# Patient Record
Sex: MALE | Race: WHITE | Employment: UNEMPLOYED | URBAN - METROPOLITAN AREA
[De-identification: names, ages, dates, MRNs, and addresses within clinical notes are randomized per-mention and may not be internally consistent; named-entity substitution may affect disease eponyms.]

---

## 2019-09-11 ENCOUNTER — HOSPITAL ENCOUNTER (EMERGENCY)
Age: 44
Discharge: HOME | End: 2019-09-12
Attending: EMERGENCY MEDICINE
Payer: MEDICAID

## 2019-09-11 VITALS
TEMPERATURE: 98.4 F | BODY MASS INDEX: 20.76 KG/M2 | RESPIRATION RATE: 16 BRPM | HEIGHT: 70 IN | DIASTOLIC BLOOD PRESSURE: 90 MMHG | HEART RATE: 110 BPM | WEIGHT: 145 LBS | SYSTOLIC BLOOD PRESSURE: 123 MMHG

## 2019-09-11 DIAGNOSIS — G89.18 POST-OP PAIN: Primary | ICD-10-CM

## 2019-09-11 PROCEDURE — 99283 EMERGENCY DEPT VISIT LOW MDM: CPT

## 2019-09-11 RX ORDER — BUPROPION HYDROCHLORIDE 150 MG/1
150 TABLET, EXTENDED RELEASE ORAL 2 TIMES DAILY
COMMUNITY

## 2019-09-11 RX ORDER — OXYCODONE HYDROCHLORIDE 5 MG/1
5 TABLET ORAL EVERY 4 HOURS PRN
COMMUNITY
End: 2020-03-04

## 2019-09-11 RX ORDER — LIDOCAINE 50 MG/G
1 PATCH TOPICAL DAILY
Qty: 12 PATCH | Refills: 0 | Status: SHIPPED | OUTPATIENT
Start: 2019-09-11 | End: 2020-03-04

## 2019-09-11 ASSESSMENT — PAIN SCALES - GENERAL: PAINLEVEL_OUTOF10: 7

## 2019-09-11 ASSESSMENT — PAIN DESCRIPTION - FREQUENCY: FREQUENCY: INTERMITTENT

## 2019-09-11 ASSESSMENT — PAIN DESCRIPTION - PAIN TYPE: TYPE: ACUTE PAIN

## 2019-09-11 ASSESSMENT — PAIN DESCRIPTION - DESCRIPTORS: DESCRIPTORS: ACHING

## 2019-09-11 ASSESSMENT — PAIN DESCRIPTION - ORIENTATION: ORIENTATION: RIGHT

## 2019-09-12 NOTE — ED NOTES
Patient given box lunch, ambulates in room without difficulty.   Will call [address] RN  09/12/19 0023

## 2019-09-12 NOTE — ED NOTES
Patient states that he was not trying to OD, states that his  has been helping him to find placement.       Tabby Casey RN  09/12/19 0005

## 2019-09-12 NOTE — ED PROVIDER NOTES
May 15, 2020      Michelle Zapien  [address] [place] [address]        Dear Michelle Zapien,    My records indicate that you have not completed the Dexa scan that was recently ordered. These tests are very important in the management of your healthcare.      The tests have been re-ordered and can be completed at your convenience with an appointment. Please schedule an appointment for the lab at your earliest convenience. The order(s) will  in 30 days. Please call [phone] to make your appointment.    If you have any questions or concerns, please do not hesitate to contact us during normal business office hours. If the orders  again, I will assume that you no longer wish to have these done.    Sincerely,   Kim Lo MD                             moist.   Neck: Supple, symmetrical, trachea midline, no adenopathy. No jugular venous distention. Lungs:   Clear to auscultation bilaterally, respirationsunlabored. No rales, rhonchi or wheezes. Chest Wall:  No tenderness. Heart:  Regular rate and rhythm, S1 and S2 normal, no murmur, rub or gallop. Abdomen:   Soft, non-tender, bowel sounds active,   no masses, no organomegaly. Extremities: No edema, cords or calf tenderness. Full range of motion. Pulses: 2+ and symmetric   Skin: Turgor is normal, no rashes or lesions. Neurologic: Alert and oriented X 3. No focal findings. Motor grossly normal.  Speech clear, no drift, CN III-XII grossly intact,        DIAGNOSTIC RESULTS   LABS:    Labs Reviewed - No data to display    All other labs were within normal range or not returned as of this dictation. EKG: All EKG's are interpreted by the Emergency Department Physician who eithersigns or Co-signs this chart in the absence of a cardiologist.        RADIOLOGY:   Non-plain film images such as CT, Ultrasound and MRI are read by the radiologist. Plain radiographic images are visualized by myself. *    Interpretation per the Radiologist below, if available at the time of this note:    No orders to display         PROCEDURES   Unless otherwise noted below, none     Procedures    *    CRITICAL CARE TIME   N/A      EMERGENCY DEPARTMENT COURSE and DIFFERENTIALDIAGNOSIS/MDM:   Vitals:    Vitals:    09/11/19 2052   BP: (!) 123/90   Pulse: 110   Resp: 16   Temp: 98.4 °F (36.9 °C)   Weight: 65.8 kg (145 lb)   Height: 5' 10\" (1.778 m)       Patient was given thefollowing medications:  Medications - No data to display      The patient tolerated their visit well. The patient and / or the familywere informed of the results of any tests, a time was given to answer questions. FINAL IMPRESSION      1.  Post-op pain          DISPOSITION/PLAN   DISPOSITION Decision To Discharge 09/11/2019 09:41:43 PM    Patient

## 2020-03-04 ENCOUNTER — OFFICE VISIT (OUTPATIENT)
Dept: ENT CLINIC | Age: 45
End: 2020-03-04
Payer: MEDICAID

## 2020-03-04 VITALS — WEIGHT: 157.9 LBS | BODY MASS INDEX: 22.6 KG/M2 | HEIGHT: 70 IN

## 2020-03-04 PROBLEM — Z59.00 HOMELESSNESS: Status: ACTIVE | Noted: 2019-09-17

## 2020-03-04 PROBLEM — F41.9 ANXIETY: Status: ACTIVE | Noted: 2017-11-01

## 2020-03-04 PROBLEM — G40.909 SEIZURE DISORDER (HCC): Status: ACTIVE | Noted: 2017-01-12

## 2020-03-04 PROBLEM — F19.10 POLYSUBSTANCE ABUSE (HCC): Status: ACTIVE | Noted: 2019-09-17

## 2020-03-04 PROBLEM — T07.XXXA: Status: ACTIVE | Noted: 2017-01-12

## 2020-03-04 PROBLEM — F15.10 METHAMPHETAMINE ABUSE (HCC): Status: ACTIVE | Noted: 2019-12-30

## 2020-03-04 PROBLEM — R68.84 JAW PAIN: Status: ACTIVE | Noted: 2018-04-05

## 2020-03-04 PROBLEM — Z87.898 HISTORY OF PSEUDOSEIZURE: Status: ACTIVE | Noted: 2019-09-17

## 2020-03-04 PROBLEM — Z65.3 PROBLEMS RELATED TO OTHER LEGAL CIRCUMSTANCES: Status: ACTIVE | Noted: 2019-09-17

## 2020-03-04 PROBLEM — Z72.0 TOBACCO ABUSE: Status: ACTIVE | Noted: 2019-12-30

## 2020-03-04 PROBLEM — F31.10 MANIC BIPOLAR I DISORDER (HCC): Status: ACTIVE | Noted: 2018-05-07

## 2020-03-04 PROBLEM — F95.9 TIC DISORDER: Status: ACTIVE | Noted: 2017-01-12

## 2020-03-04 PROBLEM — Z98.890: Status: ACTIVE | Noted: 2020-03-04

## 2020-03-04 PROBLEM — Z91.199 NONCOMPLIANCE WITH TREATMENT: Status: ACTIVE | Noted: 2019-09-17

## 2020-03-04 PROBLEM — G93.41 ACUTE METABOLIC ENCEPHALOPATHY: Status: ACTIVE | Noted: 2019-12-30

## 2020-03-04 PROBLEM — F19.94 SUBSTANCE INDUCED MOOD DISORDER (HCC): Status: ACTIVE | Noted: 2019-09-17

## 2020-03-04 PROBLEM — S52.511A: Status: ACTIVE | Noted: 2019-09-06

## 2020-03-04 PROCEDURE — G8420 CALC BMI NORM PARAMETERS: HCPCS | Performed by: OTOLARYNGOLOGY

## 2020-03-04 PROCEDURE — G8484 FLU IMMUNIZE NO ADMIN: HCPCS | Performed by: OTOLARYNGOLOGY

## 2020-03-04 PROCEDURE — G8427 DOCREV CUR MEDS BY ELIG CLIN: HCPCS | Performed by: OTOLARYNGOLOGY

## 2020-03-04 PROCEDURE — 99203 OFFICE O/P NEW LOW 30 MIN: CPT | Performed by: OTOLARYNGOLOGY

## 2020-03-04 PROCEDURE — 4004F PT TOBACCO SCREEN RCVD TLK: CPT | Performed by: OTOLARYNGOLOGY

## 2020-03-04 RX ORDER — FLUTICASONE PROPIONATE 50 MCG
1 SPRAY, SUSPENSION (ML) NASAL
COMMUNITY

## 2020-03-04 RX ORDER — PRAZOSIN HYDROCHLORIDE 1 MG/1
2 CAPSULE ORAL DAILY
COMMUNITY

## 2020-03-04 RX ORDER — IBUPROFEN 800 MG/1
800 TABLET ORAL
COMMUNITY
Start: 2019-09-12

## 2020-03-04 RX ORDER — POLYETHYLENE GLYCOL 3350 17 G/17G
17 POWDER, FOR SOLUTION ORAL
COMMUNITY

## 2020-03-04 RX ORDER — PENICILLIN V POTASSIUM 500 MG/1
500 TABLET ORAL 2 TIMES DAILY
COMMUNITY
Start: 2020-03-03 | End: 2020-03-13

## 2020-03-04 RX ORDER — BUPRENORPHINE AND NALOXONE 8; 2 MG/1; MG/1
2 FILM, SOLUBLE BUCCAL; SUBLINGUAL
COMMUNITY

## 2020-03-04 RX ORDER — CLONIDINE HYDROCHLORIDE 0.1 MG/1
0.1 TABLET ORAL
COMMUNITY

## 2020-03-27 ENCOUNTER — TELEPHONE (OUTPATIENT)
Dept: ENT CLINIC | Age: 45
End: 2020-03-27

## 2020-03-27 NOTE — TELEPHONE ENCOUNTER
He came up on the list for virtual visits apparently because his post audiogram follow-up appointment was never canceled when his audiogram was canceled. Therefore it was still on the list.  Therefore I asked for a follow-up virtual visit. However since he did not have the audiogram, he does not need to proceed with a follow-up visit until the audiogram is done. That will probably be a live visit because he cannot have the audiogram done until after the coronavirus restrictions are lifted. Therefore once his audiogram is rescheduled he should schedule follow-up visit with me.

## 2021-05-05 ENCOUNTER — OFFICE VISIT (OUTPATIENT)
Dept: ENT CLINIC | Age: 46
End: 2021-05-05
Payer: MEDICAID

## 2021-05-05 VITALS
DIASTOLIC BLOOD PRESSURE: 81 MMHG | TEMPERATURE: 98 F | RESPIRATION RATE: 14 BRPM | SYSTOLIC BLOOD PRESSURE: 112 MMHG | WEIGHT: 157 LBS | BODY MASS INDEX: 22.48 KG/M2 | HEART RATE: 67 BPM | HEIGHT: 70 IN

## 2021-05-05 DIAGNOSIS — Z90.09 HISTORY OF STAPEDECTOMY: Chronic | ICD-10-CM

## 2021-05-05 DIAGNOSIS — H91.93 BILATERAL HEARING LOSS, UNSPECIFIED HEARING LOSS TYPE: Primary | ICD-10-CM

## 2021-05-05 PROCEDURE — 99213 OFFICE O/P EST LOW 20 MIN: CPT | Performed by: OTOLARYNGOLOGY

## 2021-05-05 PROCEDURE — 1036F TOBACCO NON-USER: CPT | Performed by: OTOLARYNGOLOGY

## 2021-05-05 PROCEDURE — G8420 CALC BMI NORM PARAMETERS: HCPCS | Performed by: OTOLARYNGOLOGY

## 2021-05-05 PROCEDURE — G8427 DOCREV CUR MEDS BY ELIG CLIN: HCPCS | Performed by: OTOLARYNGOLOGY

## 2021-05-05 RX ORDER — LORATADINE 10 MG/1
10 CAPSULE, LIQUID FILLED ORAL DAILY
COMMUNITY

## 2021-05-05 ASSESSMENT — ENCOUNTER SYMPTOMS
RHINORRHEA: 0
SINUS PAIN: 0
SORE THROAT: 0

## 2021-05-05 NOTE — PROGRESS NOTES
[facility]       NEW PATIENT VISIT      PCP:  No primary care provider on file. REFERRED BY:   Dr. Tova Amaya at [facility] in [address] [facility]  Chief Complaint   Patient presents with    Hearing Loss     left ear. HISTORY OF PRESENT ILLNESS       Sudha Vicente is a 40 y.o. male here for evaluation and treatment of hearing loss. \"I can't hear out of my left ear. \"  He stated that he had a right stapedectomy in about 1988, which helped his hearing. Now hearing is decreased in left ear for about two years. Had no surgery on left ear. \"I have very little hearing out of it. \"  Right ear hearing \"decently. \"  Patient wants to have surgery on the left ear to improve his hearing. REVIEW OF SYSTEMS   Review of Systems   Constitutional: Negative for chills and fever. HENT: Positive for hearing loss. Negative for congestion, ear discharge, ear pain, rhinorrhea, sinus pain, sore throat and tinnitus. PAST MEDICAL HISTORY    History reviewed. No pertinent past medical history. Past Surgical History:   Procedure Laterality Date    EAR SURGERY Right 1988         EXAMINATION      Vitals:    05/05/21 1022   BP: 112/81   Pulse: 67   Resp: 14   Temp: 98 °F (36.7 °C)   Weight: 157 lb (71.2 kg)   Height: 5' 10\" (1.778 m)         Physical Exam  Vitals reviewed. Constitutional:       General: He is awake. He is not in acute distress. Appearance: Normal appearance. He is well-developed. He is not ill-appearing or toxic-appearing. HENT:      Head: Normocephalic and atraumatic. Salivary Glands: Right salivary gland is not diffusely enlarged or tender. Left salivary gland is not diffusely enlarged or tender.       Right Ear: Tympanic membrane, ear canal and external ear normal.      Left Ear: Tympanic membrane, ear canal and external ear normal.      Ears:      Angel exam findings: lateralizes left. Right Rinne: AC > BC. Left Rinne: BC > AC. Comments: Finger rub:  Able to hear finger rub at the external meatus bilaterally. Couldn't hear left Rinne air. \"I couldn't hear that at all. \"     Nose: Septal deviation (leftward mild) present. No nasal deformity, mucosal edema, congestion or rhinorrhea. Right Turbinates: Not enlarged. Left Turbinates: Not enlarged. Right Sinus: No maxillary sinus tenderness or frontal sinus tenderness. Left Sinus: No maxillary sinus tenderness or frontal sinus tenderness. Mouth/Throat:      Lips: Pink. No lesions. Mouth: Mucous membranes are moist. No oral lesions. Tongue: No lesions. Palate: No mass and lesions. Pharynx: Oropharynx is clear. Uvula midline. No oropharyngeal exudate or posterior oropharyngeal erythema. Tonsils: No tonsillar exudate or tonsillar abscesses. 1+ on the right. 1+ on the left. Neck:      Thyroid: No thyroid mass, thyromegaly or thyroid tenderness. Trachea: No tracheal deviation. Musculoskeletal:      Cervical back: Normal range of motion and neck supple. No rigidity. No muscular tenderness. Lymphadenopathy:      Cervical: No cervical adenopathy. Neurological:      Mental Status: He is alert. Alleman Rosalba / Carole Speaker / Juan Haskins was seen today for hearing loss. Diagnoses and all orders for this visit:    Bilateral hearing loss, unspecified hearing loss type  -     [address] [place]. MICHAEL, Audiology, [facility]    History of stapedectomy  Comments:  right ear, about 1988          RECOMMENDATIONS/PLAN      1. Audiometric testing.      2. Return for recheck/follow-up after hearing test.          MEDICAL DECISION MAKING    # and complexity of problems addressed:  86559 - Moderate  1 or more chronic illness with exacerbation, progression or side effect of tx (otosclerosis left ear with progression of hearing loss)    Amount and/or Complexity of Data to be Reviewed and Analyzed  82184 - Straightforward  no data or only 1 test or document reviewed or ordered    Risk of Complications and /or Morbidity or Mortality of Patient Management  46647 -Low

## 2021-09-03 ENCOUNTER — PROCEDURE VISIT (OUTPATIENT)
Dept: AUDIOLOGY | Age: 46
End: 2021-09-03
Payer: MEDICAID

## 2021-09-03 DIAGNOSIS — H90.0 CONDUCTIVE HEARING LOSS OF BOTH EARS: Primary | ICD-10-CM

## 2021-09-03 PROCEDURE — 92567 TYMPANOMETRY: CPT | Performed by: AUDIOLOGIST

## 2021-09-03 PROCEDURE — 92557 COMPREHENSIVE HEARING TEST: CPT | Performed by: AUDIOLOGIST

## 2021-09-03 NOTE — PROGRESS NOTES
[address] [facility]    9/3/2021     PCP: No primary care provider on file. MRN: <[id number]>     AUDIOLOGIC AND OTHER PERTINENT MEDICAL HISTORY:      Toy Chiu was seen for hearing and middle ear evaluation. Otolaryngology  Caleb Milton MD) is referral source of today's appointment. Patient presents with Hearing Loss. Interested in ear surgery of left ear. Reports history of right ear stapedectomy during childhood, which has improved hearing. AUDIOGRAM/IMMITTANCE (MIDDLE EAR FUNCTION) TEST RESULTS:      Audiogram demonstrates mild conductive loss of right ear. Severe conductive loss, that rises to mild, from left. Loss is asymmetrical, greater in left. Speech discrimination was excellent in quiet, at elevated levels. Quick SIN (hearing performance in noise) assessment was administered. Toy Chiu scored  23/25.5, consistent with 2.5 dB signal-noise ratio loss. This suggests minimal difficulty hearing during the presence of noise or complex listening enviornments. Right Immittance: Tympanometry consistent with normal middle ear status (Type A tympanogram)     Left Immittance:   Tympanometry consistent with normal middle ear status (Type A tympanogram)    Ipsilateral acoustic reflexes were elevated or absent at isolated frequencies, bilterally. Chart cc'd to: Caleb Milton MD                            COUNSELING:        Reviewed purpose of testing completed today, general auditory system function, and results obtained today.   Patient was provided with a copy of audiogram.          Degree of   Hearing Sensitivity dB Range   Within Normal Limits (WNL) 0 - 20   Mild 20 - 40   Moderate 40 - 55   Moderately-Severe 55 - 70   Severe 70 - 90   Profound 90 +          RECOMMENDATIONS:          Jewel Robert MD  to advise    Electronically signed by Karmen Gosselin, AuD on 9/3/21 at 10:47 AM.

## 2022-05-18 ENCOUNTER — OFFICE VISIT (OUTPATIENT)
Dept: ENT CLINIC | Age: 47
End: 2022-05-18
Payer: MEDICAID

## 2022-05-18 VITALS
OXYGEN SATURATION: 98 % | TEMPERATURE: 97.3 F | DIASTOLIC BLOOD PRESSURE: 79 MMHG | HEART RATE: 60 BPM | SYSTOLIC BLOOD PRESSURE: 110 MMHG

## 2022-05-18 DIAGNOSIS — H90.0 CONDUCTIVE HEARING LOSS OF BOTH EARS: Primary | Chronic | ICD-10-CM

## 2022-05-18 DIAGNOSIS — Z90.09 HISTORY OF STAPEDECTOMY: ICD-10-CM

## 2022-05-18 DIAGNOSIS — H80.92 OTOSCLEROSIS OF LEFT EAR: Chronic | ICD-10-CM

## 2022-05-18 PROCEDURE — G8427 DOCREV CUR MEDS BY ELIG CLIN: HCPCS | Performed by: OTOLARYNGOLOGY

## 2022-05-18 PROCEDURE — 99213 OFFICE O/P EST LOW 20 MIN: CPT | Performed by: OTOLARYNGOLOGY

## 2022-05-18 PROCEDURE — 1036F TOBACCO NON-USER: CPT | Performed by: OTOLARYNGOLOGY

## 2022-05-18 PROCEDURE — G8421 BMI NOT CALCULATED: HCPCS | Performed by: OTOLARYNGOLOGY

## 2022-05-18 NOTE — PATIENT INSTRUCTIONS
· You should consider amplification therapy, hearing aid, if you are having difficulty communicating and/or hearing important sounds. You may follow up with Dr. Lamar Sheridan or with another hearing aid provider of your choice. · You may have a decrease in understanding of speech in the presence of background noise and expected difficulty understanding speech in the presence of moderate to high level of background noise. · You should return if your ears plug up with ear wax causing difficulty hearing or pressure. · You should return for an annual hearing test to monitor your hearing, and whenever your hearing further decreases significantly. · You should avoid exposure to excessively high levels of noise/sound and use hearing protection measures we discussed, as needed, if such exposure is unavoidable. · NO Q-TIPS IN THE EARS  You should never clean your ears with a Q-tip, cotton tipped applicator, Yvette pin, paper clip, pen cap, nail file, or any other instrument. I recommend only use of one the several ear wax removal kits available \"over the counter\" (eg. [employer] or [employer], or [employer]) if you feel a need to try to remove ear wax. No other methods should be self used for this purpose as there is danger of injury to the ear and risk of irreparable and irreversible permanent hearing loss and permanent dizziness.

## 2022-05-18 NOTE — PROGRESS NOTES
[facility]       CHIEF COMPLAINT  Chief Complaint   Patient presents with    Hearing Loss         HISTORY       Dyana Izquierdo is a 56 y.o. male here for follow up of hearing loss. AUDIOGRAM    I independently assessed and evaluated the results of the audiogram, a test performed by another healthcare provider. COUNSELING    Audiogram results were reviewed and discussed with Sp Clinton. I advised of type and severity of hearing loss and the probable etiology of otosclerosis or ossicular fixation. I discussed the possible associated impairment. I advised of the need for avoidance of prolonged or frequent exposure to excessive noise and the need for noise protection, if such exposure is unavoidable. I discussed the possible benefits of hearing aids, or other amplification therapy. I discussed possible benefits and risks of surgery. I discussed stapedectomy surgery. I discussed the possible etiology of tinnitus and treatment/coping measures including masking techniques. I advised of the need for annual and prn hearing tests. Brennon Malloy / Caleb Lord / Kierra Hart / Pawan Leyva was seen today for hearing loss. Diagnoses and all orders for this visit:    Conductive hearing loss of both ears    Otosclerosis of left ear    History of stapedectomy  Comments:  right ear           RECOMMENDATIONS / PLAN    1. Left stapedectomy. Patient wishes to proceed. Will refer to Dr. Asuncion Rodriguez for stapedectomy. 2. Return for any further ENT or sinus problems or symptoms.

## 2024-05-04 ENCOUNTER — HOSPITAL ENCOUNTER (EMERGENCY)
Age: 49
Discharge: HOME | End: 2024-05-04
Attending: EMERGENCY MEDICINE
Payer: MEDICAID

## 2024-05-04 VITALS
BODY MASS INDEX: 21.5 KG/M2 | SYSTOLIC BLOOD PRESSURE: 115 MMHG | HEIGHT: 67 IN | RESPIRATION RATE: 12 BRPM | TEMPERATURE: 99.5 F | OXYGEN SATURATION: 94 % | DIASTOLIC BLOOD PRESSURE: 84 MMHG | WEIGHT: 137 LBS | HEART RATE: 70 BPM

## 2024-05-04 DIAGNOSIS — T40.601A OPIATE OVERDOSE, ACCIDENTAL OR UNINTENTIONAL, INITIAL ENCOUNTER (HCC): Primary | ICD-10-CM

## 2024-05-04 DIAGNOSIS — F19.10 POLYSUBSTANCE ABUSE (HCC): ICD-10-CM

## 2024-05-04 LAB
ALBUMIN SERPL-MCNC: 4.4 G/DL (ref 3.4–5)
ALBUMIN/GLOB SERPL: 1.5 {RATIO} (ref 1.1–2.2)
ALP SERPL-CCNC: 63 U/L (ref 40–129)
ALT SERPL-CCNC: 31 U/L (ref 10–40)
AMPHETAMINES UR QL SCN>1000 NG/ML: ABNORMAL
ANION GAP SERPL CALCULATED.3IONS-SCNC: 13 MMOL/L (ref 3–16)
AST SERPL-CCNC: 47 U/L (ref 15–37)
BARBITURATES UR QL SCN>200 NG/ML: ABNORMAL
BASOPHILS # BLD: 0 K/UL (ref 0–0.2)
BASOPHILS NFR BLD: 0.3 %
BENZODIAZ UR QL SCN>200 NG/ML: POSITIVE
BILIRUB SERPL-MCNC: 0.6 MG/DL (ref 0–1)
BUN SERPL-MCNC: 18 MG/DL (ref 7–20)
CALCIUM SERPL-MCNC: 9.1 MG/DL (ref 8.3–10.6)
CANNABINOIDS UR QL SCN>50 NG/ML: POSITIVE
CHLORIDE SERPL-SCNC: 101 MMOL/L (ref 99–110)
CO2 SERPL-SCNC: 25 MMOL/L (ref 21–32)
COCAINE UR QL SCN: POSITIVE
CREAT SERPL-MCNC: 1 MG/DL (ref 0.9–1.3)
DEPRECATED RDW RBC AUTO: 13.5 % (ref 12.4–15.4)
DRUG SCREEN COMMENT UR-IMP: ABNORMAL
EOSINOPHIL # BLD: 0.1 K/UL (ref 0–0.6)
EOSINOPHIL NFR BLD: 0.9 %
ETHANOLAMINE SERPL-MCNC: NORMAL MG/DL (ref 0–0.08)
FENTANYL SCREEN, URINE: POSITIVE
GFR SERPLBLD CREATININE-BSD FMLA CKD-EPI: >90 ML/MIN/{1.73_M2}
GLUCOSE SERPL-MCNC: 131 MG/DL (ref 70–99)
HCT VFR BLD AUTO: 38.2 % (ref 40.5–52.5)
HGB BLD-MCNC: 13.2 G/DL (ref 13.5–17.5)
LYMPHOCYTES # BLD: 1.2 K/UL (ref 1–5.1)
LYMPHOCYTES NFR BLD: 16.6 %
MCH RBC QN AUTO: 31.9 PG (ref 26–34)
MCHC RBC AUTO-ENTMCNC: 34.7 G/DL (ref 31–36)
MCV RBC AUTO: 92 FL (ref 80–100)
METHADONE UR QL SCN>300 NG/ML: ABNORMAL
MONOCYTES # BLD: 0.5 K/UL (ref 0–1.3)
MONOCYTES NFR BLD: 6.6 %
NEUTROPHILS # BLD: 5.4 K/UL (ref 1.7–7.7)
NEUTROPHILS NFR BLD: 75.6 %
OPIATES UR QL SCN>300 NG/ML: ABNORMAL
OXYCODONE UR QL SCN: ABNORMAL
PCP UR QL SCN>25 NG/ML: ABNORMAL
PH UR STRIP: 5 [PH]
PLATELET # BLD AUTO: 272 K/UL (ref 135–450)
PMV BLD AUTO: 7.4 FL (ref 5–10.5)
POTASSIUM SERPL-SCNC: 3.8 MMOL/L (ref 3.5–5.1)
PROT SERPL-MCNC: 7.3 G/DL (ref 6.4–8.2)
RBC # BLD AUTO: 4.15 M/UL (ref 4.2–5.9)
SODIUM SERPL-SCNC: 139 MMOL/L (ref 136–145)
WBC # BLD AUTO: 7.2 K/UL (ref 4–11)

## 2024-05-04 PROCEDURE — 85025 COMPLETE CBC W/AUTO DIFF WBC: CPT

## 2024-05-04 PROCEDURE — 80307 DRUG TEST PRSMV CHEM ANLYZR: CPT

## 2024-05-04 PROCEDURE — 99283 EMERGENCY DEPT VISIT LOW MDM: CPT

## 2024-05-04 PROCEDURE — 80053 COMPREHEN METABOLIC PANEL: CPT

## 2024-05-04 PROCEDURE — 36415 COLL VENOUS BLD VENIPUNCTURE: CPT

## 2024-05-04 PROCEDURE — 82077 ASSAY SPEC XCP UR&BREATH IA: CPT

## 2024-05-04 ASSESSMENT — PAIN SCALES - GENERAL: PAINLEVEL_OUTOF10: 0

## 2024-05-04 ASSESSMENT — PAIN - FUNCTIONAL ASSESSMENT: PAIN_FUNCTIONAL_ASSESSMENT: 0-10

## 2024-05-04 NOTE — ED PROVIDER NOTES
EMERGENCY MEDICINE ATTENDING NOTE  Rc Gonzáles Jr., DO, FACEP, FAAEM        CHIEF COMPLAINT  Chief Complaint   Patient presents with    Drug Overdose     Pt brought per [facility] from work at [employer], he became unresponsive, was given Narcan by police then he came around.  Pt arrives drowsy but AAO x 3.  Pt denying drug use        HISTORY OF PRESENT ILLNESS  Tien Lim is a 49 y.o. male who presents to the ED for evaluation of possible drug overdose.  [facility] and [facility] by police.  He then became awake and completely oriented.  Denied taking anything at all.  Patient is not really cooperative with exam not really answering questions at this time and states he just wants to be left alone.  Unable to obtain any other significant history.    Nursing/triage notes reviewed.  No other complaints, modifying factors or associated symptoms.     REVIEW OF SYSTEMS:  Unable to fully assess secondary to lack of cooperation.    PAST MEDICAL HISTORY  History reviewed. No pertinent past medical history.    SURGICAL HISTORY  Past Surgical History:   Procedure Laterality Date    EAR SURGERY Right 1988       FAMILY HISTORY  History reviewed. No pertinent family history.    SOCIAL HISTORY  Social History     Socioeconomic History    Marital status: Legally      Spouse name: Not on file    Number of children: Not on file    Years of education: Not on file    Highest education level: Not on file   Occupational History    Not on file   Tobacco Use    Smoking status: Former     Current packs/day: 0.00     Average packs/day: 1 pack/day for 12.0 years (12.0 ttl pk-yrs)     Types: Cigarettes     Start date: 2/5/2009     Quit date: 2/5/2021     Years since quitting: 3.2    Smokeless tobacco: Never   Vaping Use    Vaping Use: Every day    Substances: Nicotine   Substance and Sexual Activity    Alcohol use: No    Drug use: Not Currently     Types:
